# Patient Record
Sex: FEMALE | Race: BLACK OR AFRICAN AMERICAN | NOT HISPANIC OR LATINO | URBAN - METROPOLITAN AREA
[De-identification: names, ages, dates, MRNs, and addresses within clinical notes are randomized per-mention and may not be internally consistent; named-entity substitution may affect disease eponyms.]

---

## 2019-07-30 ENCOUNTER — EMERGENCY (EMERGENCY)
Facility: HOSPITAL | Age: 29
LOS: 1 days | Discharge: HOME | End: 2019-07-30
Attending: EMERGENCY MEDICINE | Admitting: EMERGENCY MEDICINE
Payer: MEDICAID

## 2019-07-30 VITALS
DIASTOLIC BLOOD PRESSURE: 64 MMHG | HEIGHT: 65 IN | WEIGHT: 250 LBS | SYSTOLIC BLOOD PRESSURE: 106 MMHG | HEART RATE: 72 BPM | RESPIRATION RATE: 18 BRPM | TEMPERATURE: 98 F | OXYGEN SATURATION: 97 %

## 2019-07-30 VITALS
SYSTOLIC BLOOD PRESSURE: 117 MMHG | RESPIRATION RATE: 18 BRPM | HEART RATE: 60 BPM | OXYGEN SATURATION: 99 % | DIASTOLIC BLOOD PRESSURE: 56 MMHG

## 2019-07-30 LAB
ALBUMIN SERPL ELPH-MCNC: 4.2 G/DL — SIGNIFICANT CHANGE UP (ref 3.3–5)
ALP SERPL-CCNC: 43 U/L — SIGNIFICANT CHANGE UP (ref 40–120)
ALT FLD-CCNC: 15 U/L — SIGNIFICANT CHANGE UP (ref 10–45)
ANION GAP SERPL CALC-SCNC: 12 MMOL/L — SIGNIFICANT CHANGE UP (ref 5–17)
APPEARANCE UR: CLEAR — SIGNIFICANT CHANGE UP
AST SERPL-CCNC: 11 U/L — SIGNIFICANT CHANGE UP (ref 10–40)
BASOPHILS # BLD AUTO: 0.03 K/UL — SIGNIFICANT CHANGE UP (ref 0–0.2)
BASOPHILS NFR BLD AUTO: 0.3 % — SIGNIFICANT CHANGE UP (ref 0–2)
BILIRUB SERPL-MCNC: 0.4 MG/DL — SIGNIFICANT CHANGE UP (ref 0.2–1.2)
BILIRUB UR-MCNC: NEGATIVE — SIGNIFICANT CHANGE UP
BLD GP AB SCN SERPL QL: NEGATIVE — SIGNIFICANT CHANGE UP
BUN SERPL-MCNC: 8 MG/DL — SIGNIFICANT CHANGE UP (ref 7–23)
CALCIUM SERPL-MCNC: 8.8 MG/DL — SIGNIFICANT CHANGE UP (ref 8.4–10.5)
CHLORIDE SERPL-SCNC: 103 MMOL/L — SIGNIFICANT CHANGE UP (ref 96–108)
CO2 SERPL-SCNC: 21 MMOL/L — LOW (ref 22–31)
COLOR SPEC: YELLOW — SIGNIFICANT CHANGE UP
CREAT SERPL-MCNC: 0.54 MG/DL — SIGNIFICANT CHANGE UP (ref 0.5–1.3)
DIFF PNL FLD: NEGATIVE — SIGNIFICANT CHANGE UP
EOSINOPHIL # BLD AUTO: 0.22 K/UL — SIGNIFICANT CHANGE UP (ref 0–0.5)
EOSINOPHIL NFR BLD AUTO: 2.4 % — SIGNIFICANT CHANGE UP (ref 0–6)
GLUCOSE SERPL-MCNC: 84 MG/DL — SIGNIFICANT CHANGE UP (ref 70–99)
GLUCOSE UR QL: NEGATIVE — SIGNIFICANT CHANGE UP
HCG SERPL-ACNC: HIGH MIU/ML
HCT VFR BLD CALC: 32.6 % — LOW (ref 34.5–45)
HGB BLD-MCNC: 10.2 G/DL — LOW (ref 11.5–15.5)
IMM GRANULOCYTES NFR BLD AUTO: 0.2 % — SIGNIFICANT CHANGE UP (ref 0–1.5)
KETONES UR-MCNC: NEGATIVE — SIGNIFICANT CHANGE UP
LEUKOCYTE ESTERASE UR-ACNC: NEGATIVE — SIGNIFICANT CHANGE UP
LYMPHOCYTES # BLD AUTO: 4.47 K/UL — HIGH (ref 1–3.3)
LYMPHOCYTES # BLD AUTO: 48.5 % — HIGH (ref 13–44)
MCHC RBC-ENTMCNC: 25.2 PG — LOW (ref 27–34)
MCHC RBC-ENTMCNC: 31.3 GM/DL — LOW (ref 32–36)
MCV RBC AUTO: 80.7 FL — SIGNIFICANT CHANGE UP (ref 80–100)
MONOCYTES # BLD AUTO: 0.64 K/UL — SIGNIFICANT CHANGE UP (ref 0–0.9)
MONOCYTES NFR BLD AUTO: 6.9 % — SIGNIFICANT CHANGE UP (ref 2–14)
NEUTROPHILS # BLD AUTO: 3.83 K/UL — SIGNIFICANT CHANGE UP (ref 1.8–7.4)
NEUTROPHILS NFR BLD AUTO: 41.7 % — LOW (ref 43–77)
NITRITE UR-MCNC: NEGATIVE — SIGNIFICANT CHANGE UP
NRBC # BLD: 0 /100 WBCS — SIGNIFICANT CHANGE UP (ref 0–0)
PH UR: 6 — SIGNIFICANT CHANGE UP (ref 5–8)
PLATELET # BLD AUTO: 299 K/UL — SIGNIFICANT CHANGE UP (ref 150–400)
POTASSIUM SERPL-MCNC: 3.6 MMOL/L — SIGNIFICANT CHANGE UP (ref 3.5–5.3)
POTASSIUM SERPL-SCNC: 3.6 MMOL/L — SIGNIFICANT CHANGE UP (ref 3.5–5.3)
PROT SERPL-MCNC: 7 G/DL — SIGNIFICANT CHANGE UP (ref 6–8.3)
PROT UR-MCNC: NEGATIVE MG/DL — SIGNIFICANT CHANGE UP
RBC # BLD: 4.04 M/UL — SIGNIFICANT CHANGE UP (ref 3.8–5.2)
RBC # FLD: 17.5 % — HIGH (ref 10.3–14.5)
RH IG SCN BLD-IMP: POSITIVE — SIGNIFICANT CHANGE UP
SODIUM SERPL-SCNC: 136 MMOL/L — SIGNIFICANT CHANGE UP (ref 135–145)
SP GR SPEC: 1.02 — SIGNIFICANT CHANGE UP (ref 1–1.03)
UROBILINOGEN FLD QL: 0.2 E.U./DL — SIGNIFICANT CHANGE UP
WBC # BLD: 9.21 K/UL — SIGNIFICANT CHANGE UP (ref 3.8–10.5)
WBC # FLD AUTO: 9.21 K/UL — SIGNIFICANT CHANGE UP (ref 3.8–10.5)

## 2019-07-30 PROCEDURE — 76817 TRANSVAGINAL US OBSTETRIC: CPT | Mod: 26

## 2019-07-30 PROCEDURE — 76817 TRANSVAGINAL US OBSTETRIC: CPT

## 2019-07-30 PROCEDURE — 86850 RBC ANTIBODY SCREEN: CPT

## 2019-07-30 PROCEDURE — 87086 URINE CULTURE/COLONY COUNT: CPT

## 2019-07-30 PROCEDURE — 99284 EMERGENCY DEPT VISIT MOD MDM: CPT

## 2019-07-30 PROCEDURE — 80053 COMPREHEN METABOLIC PANEL: CPT

## 2019-07-30 PROCEDURE — 36415 COLL VENOUS BLD VENIPUNCTURE: CPT

## 2019-07-30 PROCEDURE — 84702 CHORIONIC GONADOTROPIN TEST: CPT

## 2019-07-30 PROCEDURE — 76801 OB US < 14 WKS SINGLE FETUS: CPT

## 2019-07-30 PROCEDURE — 76801 OB US < 14 WKS SINGLE FETUS: CPT | Mod: 26

## 2019-07-30 PROCEDURE — 86900 BLOOD TYPING SEROLOGIC ABO: CPT

## 2019-07-30 PROCEDURE — 81003 URINALYSIS AUTO W/O SCOPE: CPT

## 2019-07-30 PROCEDURE — 85025 COMPLETE CBC W/AUTO DIFF WBC: CPT

## 2019-07-30 PROCEDURE — 86901 BLOOD TYPING SEROLOGIC RH(D): CPT

## 2019-07-30 RX ORDER — ACETAMINOPHEN 500 MG
650 TABLET ORAL ONCE
Refills: 0 | Status: COMPLETED | OUTPATIENT
Start: 2019-07-30 | End: 2019-07-30

## 2019-07-30 RX ADMIN — Medication 650 MILLIGRAM(S): at 21:23

## 2019-07-30 NOTE — ED PROVIDER NOTE - OBJECTIVE STATEMENT
27 yo F  @ 6 weeks gestation by LMP c/o intermittent vaginal spotting x 2 days. Pt found out she was pregnant last week. LMP . 2 days ago she started spotting and yesterday she noticed blood when she was wiping. No bleeding today. Pt not having some cramps in her pelvis, low back and legs. Pain feels similar to when she is getting her period. Associated with some intermittent nausea in the morning and breast tenderness. Denies fever, chills, dizziness, vomiting, dysuria, hematuria.

## 2019-07-30 NOTE — ED PROVIDER NOTE - PHYSICAL EXAMINATION
CONSTITUTIONAL: Well-appearing; well-nourished; in no apparent distress.   HEAD: Normocephalic; atraumatic.   EYES: PERRL; EOM intact; conjunctiva and sclera clear  ENT: normal nose; no rhinorrhea; normal pharynx with no erythema or lesions.   NECK: Supple; non-tender; no LAD  CARDIOVASCULAR: Normal S1, S2; no murmurs, rubs, or gallops. Regular rate and rhythm.   RESPIRATORY: Breathing easily; breath sounds clear and equal bilaterally; no wheezes, rhonchi, or rales.  GI: Soft; non-distended; non-tender; no palpable organomegaly.   : pelvic-no bleeding, os closed, no cmt or adnexal tenderness   MSK: FROM at all extremities, normal tone   EXT: No cyanosis or edema; N/V intact  SKIN: Normal for age and race; warm; dry; good turgor; no apparent lesions or rash.   NEURO: A & O x 3; face symmetric; grossly unremarkable.   PSYCHOLOGICAL: The patient’s mood and manner are appropriate.

## 2019-07-30 NOTE — ED PROVIDER NOTE - CLINICAL SUMMARY MEDICAL DECISION MAKING FREE TEXT BOX
29 yo F  @ 6 weeks gestation by LMP c/o intermittent vaginal spotting x 2 days. Pt found out she was pregnant last week. LMP . 2 days ago she started spotting and yesterday she noticed blood when she was wiping. No bleeding today. Pt not having some cramps in her pelvis, low back and legs. VSS. pelvic-no bleeding, os closed, no cmt or adnexal tenderness 29 yo F  @ 6 weeks gestation by LMP c/o intermittent vaginal spotting x 2 days. Pt found out she was pregnant last week. LMP . 2 days ago she started spotting and yesterday she noticed blood when she was wiping. No bleeding today. Pt not having some cramps in her pelvis, low back and legs. VSS. pelvic-no bleeding, os closed, no cmt or adnexal tenderness. r/o ectopic r/o threatened ab

## 2019-07-30 NOTE — ED ADULT NURSE NOTE - OBJECTIVE STATEMENT
28y F, A&ox3, presents to ed from  for +ucg c/o lower pelvic pain, bilateral leg pain and back pain ongoing since . PT reports took home ucg and noted +on . Pt reports had episode of spotting on . PT reports , approximately 6weeks pregnant. No cp, no sob. no active bleeding on arrival. No urinary s/s. Will continue to monitor.

## 2019-07-30 NOTE — ED ADULT TRIAGE NOTE - ARRIVAL INFO ADDITIONAL COMMENTS
pt c/o lower back pain, pelvic pain and leg pain started today. reports positive pregnancy test last week and at  today. reports intermittent vaginal spotting sunday and monday, now resolved. denies lightheadedness/dizziness

## 2019-07-30 NOTE — ED PROVIDER NOTE - NSFOLLOWUPINSTRUCTIONS_ED_ALL_ED_FT
Threatened Miscarriage    A threatened miscarriage is the term for vaginal bleeding during your first 20 weeks of pregnancy but the pregnancy has not ended. If you have vaginal bleeding during this time, your health care provider will do tests to check if you are still pregnant. If the tests show you are still pregnant and the developing baby (fetus) inside your womb (uterus) is still growing, your condition is considered a threatened miscarriage. A threatened miscarriage does not mean your pregnancy will end, but it does increase the risk of losing your pregnancy. It is important to have close follow up with your obstetrician.    SEEK IMMEDIATE MEDICAL CARE IF YOU HAVE ANY OF THE FOLLOWING SYMPTOMS: heavy vaginal bleeding, severe low back or abdominal cramps, fever/chills, or lightheadedness/dizziness.    Pelvic Rest    WHAT YOU NEED TO KNOW:    Pelvic rest is when you do not put anything in your vagina. This includes tampons, douching, or sexual activity. You may need to avoid lifting heavy objects. Your healthcare provider will tell you how long you need pelvic rest.    DISCHARGE INSTRUCTIONS:    Return to the emergency department if:     You have sudden, heavy bleeding from your vagina.      You have severe pain.    Contact your healthcare provider if:     You have discharge from your vagina that smells bad.       You have a fever.      You have questions or concerns about your condition or care.    Follow up with your healthcare provider as directed: Write down your questions so you remember to ask them during your visits.

## 2019-07-30 NOTE — ED ADULT NURSE NOTE - NSIMPLEMENTINTERV_GEN_ALL_ED
Implemented All Universal Safety Interventions:  [place] to call system. Call bell, personal items and telephone within reach. Instruct patient to call for assistance. Room bathroom lighting operational. Non-slip footwear when patient is off stretcher. Physically safe environment: no spills, clutter or unnecessary equipment. Stretcher in lowest position, wheels locked, appropriate side rails in place.

## 2019-07-30 NOTE — ED PROVIDER NOTE - ATTENDING CONTRIBUTION TO CARE
27 yo F  at 6 weeks p/w intermittent spotting and cramping x 2 days.  Belly soft nontender, VSS, os closed no blood.  Plan u/s, labs and reassess.

## 2019-07-31 LAB
CULTURE RESULTS: NO GROWTH — SIGNIFICANT CHANGE UP
SPECIMEN SOURCE: SIGNIFICANT CHANGE UP

## 2019-08-05 DIAGNOSIS — Z3A.01 LESS THAN 8 WEEKS GESTATION OF PREGNANCY: ICD-10-CM

## 2019-08-05 DIAGNOSIS — O20.0 THREATENED ABORTION: ICD-10-CM

## 2019-08-05 DIAGNOSIS — R10.2 PELVIC AND PERINEAL PAIN: ICD-10-CM
